# Patient Record
Sex: MALE | Race: WHITE
[De-identification: names, ages, dates, MRNs, and addresses within clinical notes are randomized per-mention and may not be internally consistent; named-entity substitution may affect disease eponyms.]

---

## 2020-09-16 ENCOUNTER — HOSPITAL ENCOUNTER (OUTPATIENT)
Dept: HOSPITAL 47 - RADUSWWP | Age: 20
Discharge: HOME | End: 2020-09-16
Attending: NURSE PRACTITIONER
Payer: COMMERCIAL

## 2020-09-16 DIAGNOSIS — R10.9: Primary | ICD-10-CM

## 2020-09-16 PROCEDURE — 76700 US EXAM ABDOM COMPLETE: CPT

## 2020-09-16 NOTE — US
EXAMINATION TYPE: US abdomen complete

 

DATE OF EXAM: 9/16/2020

 

COMPARISON: NONE

 

CLINICAL HISTORY: R10.9 Abdominal pain, unspecified. Abdomen pain and occasional nausea after eating 
x 2 months

 

EXAM MEASUREMENTS:

 

Liver Length:  16.4 cm   

Gallbladder Wall:  0.1 cm   

CBD:  0.4 cm

Spleen:  13.2 cm   

Right Kidney:  10.1 x 5.3 x 5.6 cm 

Left Kidney:  10.7 x 6.1 x 5.2 cm   

 

 

 

Pancreas:  wnl

Liver:  wnl  

Gallbladder:  wnl

**Evidence for sonographic Gaffney's sign:  no

CBD:  wnl 

Spleen:  visualized portions wnl, limited by overlying bowel gas   

Right Kidney:  wnl   

Left Kidney:  wnl   

Upper IVC:  wnl  

Abd Aorta:  visualized portions wnl, mid portion obscured by overlying midline bowel gas

 

 

IMPRESSION: 

1. Normal abdomen ultrasound as visualized

## 2023-04-12 ENCOUNTER — HOSPITAL ENCOUNTER (EMERGENCY)
Dept: HOSPITAL 47 - EC | Age: 23
Discharge: HOME | End: 2023-04-12
Payer: COMMERCIAL

## 2023-04-12 VITALS
DIASTOLIC BLOOD PRESSURE: 68 MMHG | TEMPERATURE: 98.4 F | RESPIRATION RATE: 14 BRPM | HEART RATE: 68 BPM | SYSTOLIC BLOOD PRESSURE: 112 MMHG

## 2023-04-12 DIAGNOSIS — T31.0: ICD-10-CM

## 2023-04-12 DIAGNOSIS — T24.231A: Primary | ICD-10-CM
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
## 2023-04-12 PROCEDURE — 16020 DRESS/DEBRID P-THICK BURN S: CPT

## 2023-04-12 PROCEDURE — 99283 EMERGENCY DEPT VISIT LOW MDM: CPT
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
## 2023-08-23 ENCOUNTER — HOSPITAL ENCOUNTER (EMERGENCY)
Dept: HOSPITAL 47 - EC | Age: 23
Discharge: HOME | End: 2023-08-23
Payer: COMMERCIAL

## 2023-08-23 VITALS — HEART RATE: 68 BPM | RESPIRATION RATE: 18 BRPM | TEMPERATURE: 98.1 F

## 2023-08-23 VITALS — DIASTOLIC BLOOD PRESSURE: 91 MMHG | SYSTOLIC BLOOD PRESSURE: 127 MMHG

## 2023-08-23 DIAGNOSIS — M23.92: Primary | ICD-10-CM

## 2023-08-23 PROCEDURE — 99283 EMERGENCY DEPT VISIT LOW MDM: CPT

## 2023-08-23 PROCEDURE — 73562 X-RAY EXAM OF KNEE 3: CPT

## 2023-08-23 NOTE — XR
EXAMINATION TYPE: XR knee complete LT

 

DATE OF EXAM: 8/23/2023

 

COMPARISON: None

 

HISTORY: Left knee pain

 

TECHNIQUE: 3 view left knee

 

FINDINGS: No acute fracture or dislocation is evident. No joint effusion is evident. Joint spaces are
 preserved.

 

Follow up exams can be performed 7-10 days from acute trauma for continued pain.

 

IMPRESSION:

1.  Acute osseous abnormality left knee

## 2023-08-23 NOTE — ED
General Adult HPI





- General


Stated complaint: Left Knee pain


Time Seen by Provider: 08/23/23 21:46


Source: RN notes reviewed





- History of Present Illness


Initial comments: 





23 year old  male presents to the emergency department with a chief 

complaint of left knee pain. Patient was walking down the stairs carrying a 

dresser when he lost his footing. Admits to pain with extension.  He has not 

taken anything prior to arrival.  Denies weakness, numbness, tingling in 

extremity.  Denies hitting his head, loss of consciousness.  Denies previous 

injury





- Related Data


                                  Previous Rx's











 Medication  Instructions  Recorded


 


Ibuprofen [Motrin] 800 mg PO Q8HR PRN #30 tab 08/23/23











                                    Allergies











Allergy/AdvReac Type Severity Reaction Status Date / Time


 


No Known Allergies Allergy   Verified 08/23/23 21:46














Review of Systems


ROS Statement: 


Those systems with pertinent positive or pertinent negative responses have been 

documented in the HPI.





ROS Other: All systems not noted in ROS Statement are negative.





Past Medical History


Past Medical History: No Reported History


History of Any Multi-Drug Resistant Organisms: None Reported


Past Surgical History: No Surgical Hx Reported


Past Psychological History: No Psychological Hx Reported


Smoking Status: Never smoker


Past Alcohol Use History: None Reported


Past Drug Use History: None Reported





General Exam





- General Exam Comments


Initial Comments: 





Visual Physical Exam





Vital signs reviewed





General: Well-appearing, nontoxic, no acute distress.


Head: Normocephalic, atraumatic


Eyes: PERRLA, EOMI


ENT: Airway patent


Chest: Nonlabored breathing


Skin: No visual rash, normal skin tone


Neuro: Alert and oriented 3


Musculoskeletal: No gross abnormalities





I performed the quick note portion of this exam, verbal signature Kassy Floyd PA-C





General: Alert, in no acute distress


Head: atraumatic normocephalic.  Eyes PERRL, EOMI intact, mucous membranes moist

 


Respiratory: Lungs clear to auscultation bilaterally


Cardiovascular: Heart rate regular rate and rhythm


Abdominal: Soft without guarding or rebound


Extremities: Normal inspection with full range of motion and normal capillary 

refill, left knee with mild edema.  Limited range of motion secondary to pain.  

No valgas, glenys laxity.  2+ DT/PT pulses.  Distal neurovascular intact


Neuroogic: alert and oriented 3, CN II-XII intact, able to ambulate with steady

 gait 


Skin: warm dry and intact with normal color





Course


                                   Vital Signs











  08/23/23 08/23/23





  21:46 23:45


 


Temperature 98.1 F 


 


Pulse Rate 68 68


 


Respiratory 18 18





Rate  


 


Blood Pressure 120/75 127/91


 


O2 Sat by Pulse 98 100





Oximetry  














Medical Decision Making





- Medical Decision Making





Was pt. sent in by a medical professional or institution (MEGAN Villa, NP, urgent 

care, hospital, or nursing home...) When possible be specific


@  -[No]


Did you speak to anyone other than the patient for history (EMS, parent, family,

 police, friend...)? What history was obtained from this source 


@  -[No]


Did you review nursing and triage notes (agree or disagree)?  Why? 


@  -[I reviewed and agree with nursing and triage notes]


Were old charts reviewed (outside hosp., previous admission, EMS record, old 

EKG, old radiological studies, urgent care reports/EKG's, nursing home records)?

Report findings 


@  -[No old charts were reviewed]


Differential Diagnosis (chest pain, altered mental status, abdominal pain women,

abdominal pain men, vaginal bleeding, weakness, fever, dyspnea, syncope, 

headache, dizziness, GI bleed, back pain, seizure, CVA, palpatations, mental 

health, musculoskeletal)? 


@  -[not applicable]


EKG interpreted by me (3pts min.).


@  -[As above]


X-rays interpreted by me (1pt min.).


@  -Left knee x-ray without any evidence of any fracture or dislocation


CT interpreted by me (1pt min.).


@  -[None done]


U/S interpreted by me (1pt. min.).


@  -[None done]


What testing was considered but not performed or refused? (CT, X-rays, U/S, 

labs)? Why?


@  -[None]


What meds were considered but not given or refused? Why?


@  -[None]


Did you discuss the management of the patient with other professionals 

(professionals i.e. MEGAN Villa, NP, lab, RT, psych nurse, , , 

teacher, , )? Give summary


@  -[No]


Was smoking cessation discussed for >3mins.?


@  -[No]


Was critical care preformed (if so, how long)?


@  -[No]


Were there social determinants of health that impacted care today? How? 

(Homelessness, low income, unemployed, alcoholism, drug addiction, 

transportation, low edu. Level, literacy, decrease access to med. care, intermediate, 

rehab)?


@  -[No]


Was there de-escalation of care discussed even if they declined (Discuss DNR or 

withdrawal of care, Hospice)? DNR status


@  -[No]


What co-morbidities impacted this encounter? (DM, HTN, Smoking, COPD, CAD, 

Cancer, CVA, ARF, Chemo, Hep., AIDS, mental health diagnosis, sleep apnea, 

morbid obesity)?


@  -[None]


Was patient admitted / discharged? Hospital course, mention meds given and 

route, prescriptions, significant lab abnormalities, going to OR and other 

pertinent info.


@  Discharged.  This is a pleasant 23-year-old  male who presents the 

emergency department with left knee pain.  Patient had a thorough history and 

physical exam performed while in the ED.  Physical exam reveals mild denies 

edema to the left knee.  Limited range of motion secondary to pain.  Distally 

neurovascularly intact.  Patient had x-rays which were negative.  He was offered

 Motrin.  Patient placed in knee immobilizer and provided crutches however he 

declined at the time of evaluation.  Referred precautions were discussed at 

length.  Patient discharged in stable condition with recommended close follow-up

 with orthopedic and PCP in 1-2 days.  Case discussed with SAMANTHA Ornelas who 

agrees with plan of care.


Undiagnosed new problem with uncertain prognosis?


@  -[No]


Drug Therapy requiring intensive monitoring for toxicity (Heparin, Nitro, 

Insulin, Cardizem)?


@  -[No]


Were any procedures done?


@  -[No]


Diagnosis/symptom?


@  -Internal Derangement of Left Knee 


Acute, or Chronic, or Acute on Chronic?


@  -Acute


Uncomplicated (without systemic symptoms) or Complicated (systemic symptoms)?


@  -Uncomplicated


Side effects of treatment?


@  -[No]


Exacerbation, Progression, or Severe Exacerbation?


@  -[No]


Poses a threat to life or bodily function? How? (Chest pain, USA, MI, pneumonia,

 PE, COPD, DKA, ARF, appy, cholecystitis, CVA, Diverticulitis, Homicidal, 

Suicidal, threat to staff... and all critical care pts)


@  -Low likelihood 





Disposition


Clinical Impression: 


 Internal derangement of left knee





Disposition: HOME SELF-CARE


Condition: Stable


Instructions (If sedation given, give patient instructions):  Knee Sprain (ED), 

Knee Pain (ED)


Additional Instructions: 


Please use crutches and knee immobilizer





Please take Tylenol Motrin for pain





Elevate extremity when able





Please return to the nearest emergency department symptoms worsen or persist


Prescriptions: 


Ibuprofen [Motrin] 800 mg PO Q8HR PRN #30 tab


 PRN Reason: Pain


Is patient prescribed a controlled substance at d/c from ED?: No


Referrals: 


None,Stated [REFERRING] - 1-2 days


Ford Boyce DO [Doctor of Osteopathic Medicine] - 1-2 days


Time of Disposition: 23:08